# Patient Record
Sex: MALE | Race: ASIAN | NOT HISPANIC OR LATINO | URBAN - METROPOLITAN AREA
[De-identification: names, ages, dates, MRNs, and addresses within clinical notes are randomized per-mention and may not be internally consistent; named-entity substitution may affect disease eponyms.]

---

## 2018-02-20 PROBLEM — Z00.00 ENCOUNTER FOR PREVENTIVE HEALTH EXAMINATION: Status: ACTIVE | Noted: 2018-02-20

## 2018-02-21 NOTE — H&P ADULT - HISTORY OF PRESENT ILLNESS
63 yo male with a PMHx carotid artery disease, HTN, DMII, known CAD with prior MI 15 years ago treated with PTCA @ [facility] (unable to locate report) presented to cardiologist with the complaint of left sided chest pain/tightness radiating down left arm occurring independent of activity and exacerbated with exertion x several weeks. Patient also experiences SANDOVAL when ambulating more than 3 city blocks associated with overall fatigued relieved with rest. Patient denies dizziness, palpitations, PND, orthopnea, syncope, leg edema. Nuclear Stress Test (1/31/18) revealed anterior and apical reversible perfusion defect with WMA, LVEF 58%. Echo (1/18/18) revealed normal LV systolic function with no WMA. Mild MR and TR, hypertensive heart disease. EF 59%. In light of patient’s risk factors, known CAD and CCS Angina Class IV Symptoms, pt now presents for cardiac catheterization with possible intervention if clinically indicated.

## 2018-02-21 NOTE — H&P ADULT - ASSESSMENT
61 yo male with a PMHx carotid artery disease, HTN, DMII, known CAD with prior MI 15 years ago treated with PTCA @ [facility] (unable to locate report) presented to cardiologist with the complaint of left sided chest pain/tightness radiating down left arm occurring independent of activity and exacerbated with exertion x several weeks. In light of patient’s risk factors, known CAD, abnormal NST, and CCS Angina Class IV Symptoms, pt now presents for cardiac catheterization with possible intervention if clinically indicated.     - s/p  mg PO once, Plavix 600 mg PO once  - NS @ 75cc / hr precath fluids  - hgb/hct and bun/cr WNL

## 2018-02-21 NOTE — H&P ADULT - NSHPPHYSICALEXAM_GEN_ALL_CORE
Appearance: Normal	  Neck: Supple, - JVD  Cardiovascular: Normal S1 S2, No JVD, No murmurs  Respiratory: Lungs clear to auscultation/No Rales, Rhonchi, Wheezing	  Gastrointestinal:  Soft, Non-tender	  Skin: No rashes, No ecchymoses, No cyanosis  Extremities: Normal range of motion, No clubbing, cyanosis or edema  Vascular: Femoral pulses 2+ b/l without bruit, DP 2+ b/l, PT 2+ b/l, rad 2+ b/l  Neurologic: Non-focal  Psychiatry: A & O x 3, Mood & affect appropriate

## 2018-02-21 NOTE — H&P ADULT - NSHPLABSRESULTS_GEN_ALL_CORE
VS: 158/80, HR 55, T 98.6F, O2 98%, RR 17, O2 98%, Ht 5'3, Wt 168 lbs, ECG: sinus bradycardia                  15.2   4.5   )-----------( 165      ( 22 Feb 2018 13:19 )             44.4   02-22    138  |  98  |  20  ----------------------------<  129<H>  4.0   |  29  |  0.90    Ca    9.5      22 Feb 2018 13:19    PT/INR - ( 22 Feb 2018 13:19 )   PT: 11.3 sec;   INR: 1.02          PTT - ( 22 Feb 2018 13:19 )  PTT:27.5 sec  Lipid Profile (02.22.18 @ 13:19)    Total Cholesterol/HDL Ratio Measurement: 3.9 RATIO    Cholesterol, Serum: 197 mg/dL    Triglycerides, Serum: 286 mg/dL    HDL Cholesterol, Serum: 51 mg/dL    Direct LDL: 89: LDL Cholesterol --- Interpretive Comment (for adults 18 and over)  Below 70                  Ideal for people at very high risk of heart  disease  Below 100                Ideal for people at risk of heart disease  100 - 129                  [place]  130 - 159                  Borderline high  160 - 189                  High  190 and Above        Very high mg/dL

## 2018-02-22 ENCOUNTER — INPATIENT (INPATIENT)
Facility: HOSPITAL | Age: 63
LOS: 0 days | Discharge: HOME | DRG: 247 | End: 2018-02-23
Attending: INTERNAL MEDICINE | Admitting: INTERNAL MEDICINE
Payer: COMMERCIAL

## 2018-02-22 VITALS
OXYGEN SATURATION: 99 % | HEART RATE: 66 BPM | DIASTOLIC BLOOD PRESSURE: 84 MMHG | RESPIRATION RATE: 16 BRPM | SYSTOLIC BLOOD PRESSURE: 174 MMHG

## 2018-02-22 LAB
ANION GAP SERPL CALC-SCNC: 11 MMOL/L — SIGNIFICANT CHANGE UP (ref 5–17)
APTT BLD: 27.5 SEC — SIGNIFICANT CHANGE UP (ref 27.5–37.4)
BASOPHILS NFR BLD AUTO: 0.7 % — SIGNIFICANT CHANGE UP (ref 0–2)
BUN SERPL-MCNC: 20 MG/DL — SIGNIFICANT CHANGE UP (ref 7–23)
CALCIUM SERPL-MCNC: 9.5 MG/DL — SIGNIFICANT CHANGE UP (ref 8.4–10.5)
CHLORIDE SERPL-SCNC: 98 MMOL/L — SIGNIFICANT CHANGE UP (ref 96–108)
CHOLEST SERPL-MCNC: 197 MG/DL — SIGNIFICANT CHANGE UP (ref 10–199)
CK MB CFR SERPL CALC: 2.3 NG/ML — SIGNIFICANT CHANGE UP (ref 0–6.7)
CO2 SERPL-SCNC: 29 MMOL/L — SIGNIFICANT CHANGE UP (ref 22–31)
CREAT SERPL-MCNC: 0.9 MG/DL — SIGNIFICANT CHANGE UP (ref 0.5–1.3)
CRP SERPL-MCNC: <0.03 MG/DL — SIGNIFICANT CHANGE UP (ref 0–0.4)
EOSINOPHIL NFR BLD AUTO: 4.9 % — SIGNIFICANT CHANGE UP (ref 0–6)
GLUCOSE BLDC GLUCOMTR-MCNC: 114 MG/DL — HIGH (ref 70–99)
GLUCOSE BLDC GLUCOMTR-MCNC: 154 MG/DL — HIGH (ref 70–99)
GLUCOSE BLDC GLUCOMTR-MCNC: 179 MG/DL — HIGH (ref 70–99)
GLUCOSE SERPL-MCNC: 129 MG/DL — HIGH (ref 70–99)
HBA1C BLD-MCNC: 7.5 % — HIGH (ref 4–5.6)
HCT VFR BLD CALC: 44.4 % — SIGNIFICANT CHANGE UP (ref 39–50)
HDLC SERPL-MCNC: 51 MG/DL — SIGNIFICANT CHANGE UP (ref 40–125)
HGB BLD-MCNC: 15.2 G/DL — SIGNIFICANT CHANGE UP (ref 13–17)
INR BLD: 1.02 — SIGNIFICANT CHANGE UP (ref 0.88–1.16)
LIPID PNL WITH DIRECT LDL SERPL: 89 MG/DL — SIGNIFICANT CHANGE UP
LYMPHOCYTES # BLD AUTO: 36.1 % — SIGNIFICANT CHANGE UP (ref 13–44)
MCHC RBC-ENTMCNC: 29.1 PG — SIGNIFICANT CHANGE UP (ref 27–34)
MCHC RBC-ENTMCNC: 34.2 G/DL — SIGNIFICANT CHANGE UP (ref 32–36)
MCV RBC AUTO: 85.1 FL — SIGNIFICANT CHANGE UP (ref 80–100)
MONOCYTES NFR BLD AUTO: 7.3 % — SIGNIFICANT CHANGE UP (ref 2–14)
NEUTROPHILS NFR BLD AUTO: 51 % — SIGNIFICANT CHANGE UP (ref 43–77)
PLATELET # BLD AUTO: 165 K/UL — SIGNIFICANT CHANGE UP (ref 150–400)
POTASSIUM SERPL-MCNC: 4 MMOL/L — SIGNIFICANT CHANGE UP (ref 3.5–5.3)
POTASSIUM SERPL-SCNC: 4 MMOL/L — SIGNIFICANT CHANGE UP (ref 3.5–5.3)
PROTHROM AB SERPL-ACNC: 11.3 SEC — SIGNIFICANT CHANGE UP (ref 9.8–12.7)
RBC # BLD: 5.22 M/UL — SIGNIFICANT CHANGE UP (ref 4.2–5.8)
RBC # FLD: 12.7 % — SIGNIFICANT CHANGE UP (ref 10.3–16.9)
SODIUM SERPL-SCNC: 138 MMOL/L — SIGNIFICANT CHANGE UP (ref 135–145)
TOTAL CHOLESTEROL/HDL RATIO MEASUREMENT: 3.9 RATIO — SIGNIFICANT CHANGE UP (ref 3.4–9.6)
TRIGL SERPL-MCNC: 286 MG/DL — HIGH (ref 10–149)
WBC # BLD: 4.5 K/UL — SIGNIFICANT CHANGE UP (ref 3.8–10.5)
WBC # FLD AUTO: 4.5 K/UL — SIGNIFICANT CHANGE UP (ref 3.8–10.5)

## 2018-02-22 PROCEDURE — 93010 ELECTROCARDIOGRAM REPORT: CPT

## 2018-02-22 PROCEDURE — 93458 L HRT ARTERY/VENTRICLE ANGIO: CPT | Mod: 26,XU

## 2018-02-22 PROCEDURE — 92928 PRQ TCAT PLMT NTRAC ST 1 LES: CPT | Mod: LD

## 2018-02-22 RX ORDER — SODIUM CHLORIDE 9 MG/ML
500 INJECTION INTRAMUSCULAR; INTRAVENOUS; SUBCUTANEOUS
Qty: 0 | Refills: 0 | Status: DISCONTINUED | OUTPATIENT
Start: 2018-02-22 | End: 2018-02-23

## 2018-02-22 RX ORDER — INSULIN LISPRO 100/ML
VIAL (ML) SUBCUTANEOUS
Qty: 0 | Refills: 0 | Status: DISCONTINUED | OUTPATIENT
Start: 2018-02-22 | End: 2018-02-23

## 2018-02-22 RX ORDER — SODIUM CHLORIDE 9 MG/ML
1000 INJECTION, SOLUTION INTRAVENOUS
Qty: 0 | Refills: 0 | Status: DISCONTINUED | OUTPATIENT
Start: 2018-02-22 | End: 2018-02-23

## 2018-02-22 RX ORDER — SIMVASTATIN 20 MG/1
20 TABLET, FILM COATED ORAL AT BEDTIME
Qty: 0 | Refills: 0 | Status: DISCONTINUED | OUTPATIENT
Start: 2018-02-22 | End: 2018-02-23

## 2018-02-22 RX ORDER — DEXTROSE 50 % IN WATER 50 %
1 SYRINGE (ML) INTRAVENOUS ONCE
Qty: 0 | Refills: 0 | Status: DISCONTINUED | OUTPATIENT
Start: 2018-02-22 | End: 2018-02-23

## 2018-02-22 RX ORDER — CLOPIDOGREL BISULFATE 75 MG/1
75 TABLET, FILM COATED ORAL DAILY
Qty: 0 | Refills: 0 | Status: DISCONTINUED | OUTPATIENT
Start: 2018-02-22 | End: 2018-02-23

## 2018-02-22 RX ORDER — ASPIRIN/CALCIUM CARB/MAGNESIUM 324 MG
81 TABLET ORAL DAILY
Qty: 0 | Refills: 0 | Status: DISCONTINUED | OUTPATIENT
Start: 2018-02-22 | End: 2018-02-23

## 2018-02-22 RX ORDER — LISINOPRIL 2.5 MG/1
10 TABLET ORAL DAILY
Qty: 0 | Refills: 0 | Status: DISCONTINUED | OUTPATIENT
Start: 2018-02-22 | End: 2018-02-23

## 2018-02-22 RX ORDER — ASPIRIN/CALCIUM CARB/MAGNESIUM 324 MG
325 TABLET ORAL ONCE
Qty: 0 | Refills: 0 | Status: COMPLETED | OUTPATIENT
Start: 2018-02-22 | End: 2018-02-22

## 2018-02-22 RX ORDER — DEXTROSE 50 % IN WATER 50 %
25 SYRINGE (ML) INTRAVENOUS ONCE
Qty: 0 | Refills: 0 | Status: DISCONTINUED | OUTPATIENT
Start: 2018-02-22 | End: 2018-02-23

## 2018-02-22 RX ORDER — DEXTROSE 50 % IN WATER 50 %
12.5 SYRINGE (ML) INTRAVENOUS ONCE
Qty: 0 | Refills: 0 | Status: DISCONTINUED | OUTPATIENT
Start: 2018-02-22 | End: 2018-02-23

## 2018-02-22 RX ORDER — SODIUM CHLORIDE 9 MG/ML
1000 INJECTION INTRAMUSCULAR; INTRAVENOUS; SUBCUTANEOUS
Qty: 0 | Refills: 0 | Status: DISCONTINUED | OUTPATIENT
Start: 2018-02-22 | End: 2018-02-22

## 2018-02-22 RX ORDER — CLOPIDOGREL BISULFATE 75 MG/1
600 TABLET, FILM COATED ORAL ONCE
Qty: 0 | Refills: 0 | Status: COMPLETED | OUTPATIENT
Start: 2018-02-22 | End: 2018-02-22

## 2018-02-22 RX ORDER — GLUCAGON INJECTION, SOLUTION 0.5 MG/.1ML
1 INJECTION, SOLUTION SUBCUTANEOUS ONCE
Qty: 0 | Refills: 0 | Status: DISCONTINUED | OUTPATIENT
Start: 2018-02-22 | End: 2018-02-23

## 2018-02-22 RX ADMIN — Medication 325 MILLIGRAM(S): at 14:24

## 2018-02-22 RX ADMIN — SIMVASTATIN 20 MILLIGRAM(S): 20 TABLET, FILM COATED ORAL at 22:34

## 2018-02-22 RX ADMIN — Medication 1: at 22:45

## 2018-02-22 RX ADMIN — SODIUM CHLORIDE 75 MILLILITER(S): 9 INJECTION INTRAMUSCULAR; INTRAVENOUS; SUBCUTANEOUS at 20:56

## 2018-02-22 RX ADMIN — CLOPIDOGREL BISULFATE 600 MILLIGRAM(S): 75 TABLET, FILM COATED ORAL at 14:24

## 2018-02-22 RX ADMIN — SODIUM CHLORIDE 75 MILLILITER(S): 9 INJECTION INTRAMUSCULAR; INTRAVENOUS; SUBCUTANEOUS at 14:24

## 2018-02-22 NOTE — PROGRESS NOTE ADULT - SUBJECTIVE AND OBJECTIVE BOX
Interventional Cardiology Radial band Removal Note    s/p Heparin 			s/p Angiomax    Pt without complaints.  VSS.    Right Radial access site Radar Hemoband in place, No hematoma, no bleed  Radial pulse: 2+    Hemostasis achieved with manual release of hemoband.    No  Vasovagal reaction.    Meds given: none    Right Radial access site  No hematoma, no bleed  Radial pulse: 2+    A/P:  s/p PTCA/Stent   -	continue to monitor  -	-OOB as tolerated  -	Post Procedure Instructions given  -                   Call [phone] if any access site issues.

## 2018-02-23 ENCOUNTER — TRANSCRIPTION ENCOUNTER (OUTPATIENT)
Age: 63
End: 2018-02-23

## 2018-02-23 VITALS — TEMPERATURE: 98 F

## 2018-02-23 LAB
ALBUMIN SERPL ELPH-MCNC: 3.9 G/DL — SIGNIFICANT CHANGE UP (ref 3.3–5)
ALP SERPL-CCNC: 44 U/L — SIGNIFICANT CHANGE UP (ref 40–120)
ALT FLD-CCNC: 16 U/L — SIGNIFICANT CHANGE UP (ref 10–45)
ANION GAP SERPL CALC-SCNC: 12 MMOL/L — SIGNIFICANT CHANGE UP (ref 5–17)
AST SERPL-CCNC: 17 U/L — SIGNIFICANT CHANGE UP (ref 10–40)
BILIRUB SERPL-MCNC: 0.8 MG/DL — SIGNIFICANT CHANGE UP (ref 0.2–1.2)
BUN SERPL-MCNC: 17 MG/DL — SIGNIFICANT CHANGE UP (ref 7–23)
CALCIUM SERPL-MCNC: 8.9 MG/DL — SIGNIFICANT CHANGE UP (ref 8.4–10.5)
CHLORIDE SERPL-SCNC: 101 MMOL/L — SIGNIFICANT CHANGE UP (ref 96–108)
CO2 SERPL-SCNC: 26 MMOL/L — SIGNIFICANT CHANGE UP (ref 22–31)
CREAT SERPL-MCNC: 0.9 MG/DL — SIGNIFICANT CHANGE UP (ref 0.5–1.3)
GLUCOSE BLDC GLUCOMTR-MCNC: 125 MG/DL — HIGH (ref 70–99)
GLUCOSE BLDC GLUCOMTR-MCNC: 146 MG/DL — HIGH (ref 70–99)
GLUCOSE SERPL-MCNC: 129 MG/DL — HIGH (ref 70–99)
HCT VFR BLD CALC: 41.1 % — SIGNIFICANT CHANGE UP (ref 39–50)
HGB BLD-MCNC: 14 G/DL — SIGNIFICANT CHANGE UP (ref 13–17)
MAGNESIUM SERPL-MCNC: 2.1 MG/DL — SIGNIFICANT CHANGE UP (ref 1.6–2.6)
MCHC RBC-ENTMCNC: 28.9 PG — SIGNIFICANT CHANGE UP (ref 27–34)
MCHC RBC-ENTMCNC: 34.1 G/DL — SIGNIFICANT CHANGE UP (ref 32–36)
MCV RBC AUTO: 84.7 FL — SIGNIFICANT CHANGE UP (ref 80–100)
PLATELET # BLD AUTO: 143 K/UL — LOW (ref 150–400)
POTASSIUM SERPL-MCNC: 4 MMOL/L — SIGNIFICANT CHANGE UP (ref 3.5–5.3)
POTASSIUM SERPL-SCNC: 4 MMOL/L — SIGNIFICANT CHANGE UP (ref 3.5–5.3)
PROT SERPL-MCNC: 6.9 G/DL — SIGNIFICANT CHANGE UP (ref 6–8.3)
RBC # BLD: 4.85 M/UL — SIGNIFICANT CHANGE UP (ref 4.2–5.8)
RBC # FLD: 12.8 % — SIGNIFICANT CHANGE UP (ref 10.3–16.9)
SODIUM SERPL-SCNC: 139 MMOL/L — SIGNIFICANT CHANGE UP (ref 135–145)
WBC # BLD: 5.3 K/UL — SIGNIFICANT CHANGE UP (ref 3.8–10.5)
WBC # FLD AUTO: 5.3 K/UL — SIGNIFICANT CHANGE UP (ref 3.8–10.5)

## 2018-02-23 PROCEDURE — 99239 HOSP IP/OBS DSCHRG MGMT >30: CPT

## 2018-02-23 PROCEDURE — 93010 ELECTROCARDIOGRAM REPORT: CPT

## 2018-02-23 RX ORDER — RAMIPRIL 5 MG
1 CAPSULE ORAL
Qty: 0 | Refills: 0 | COMMUNITY

## 2018-02-23 RX ORDER — SIMVASTATIN 20 MG/1
1 TABLET, FILM COATED ORAL
Qty: 0 | Refills: 0 | COMMUNITY

## 2018-02-23 RX ORDER — METOPROLOL TARTRATE 50 MG
1 TABLET ORAL
Qty: 30 | Refills: 3 | OUTPATIENT
Start: 2018-02-23 | End: 2018-06-22

## 2018-02-23 RX ORDER — ASPIRIN/CALCIUM CARB/MAGNESIUM 324 MG
1 TABLET ORAL
Qty: 30 | Refills: 11 | OUTPATIENT
Start: 2018-02-23 | End: 2019-02-17

## 2018-02-23 RX ORDER — ASPIRIN/CALCIUM CARB/MAGNESIUM 324 MG
1 TABLET ORAL
Qty: 0 | Refills: 0 | COMMUNITY

## 2018-02-23 RX ORDER — RAMIPRIL 5 MG
1 CAPSULE ORAL
Qty: 30 | Refills: 3 | OUTPATIENT
Start: 2018-02-23 | End: 2018-06-22

## 2018-02-23 RX ORDER — CLOPIDOGREL BISULFATE 75 MG/1
1 TABLET, FILM COATED ORAL
Qty: 30 | Refills: 11 | OUTPATIENT
Start: 2018-02-23 | End: 2019-02-17

## 2018-02-23 RX ORDER — ATORVASTATIN CALCIUM 80 MG/1
1 TABLET, FILM COATED ORAL
Qty: 30 | Refills: 3 | OUTPATIENT
Start: 2018-02-23 | End: 2018-06-22

## 2018-02-23 RX ORDER — LISINOPRIL 2.5 MG/1
1 TABLET ORAL
Qty: 0 | Refills: 0 | COMMUNITY
Start: 2018-02-23

## 2018-02-23 RX ADMIN — LISINOPRIL 10 MILLIGRAM(S): 2.5 TABLET ORAL at 07:11

## 2018-02-23 RX ADMIN — Medication 81 MILLIGRAM(S): at 11:00

## 2018-02-23 RX ADMIN — CLOPIDOGREL BISULFATE 75 MILLIGRAM(S): 75 TABLET, FILM COATED ORAL at 11:00

## 2018-02-23 NOTE — DISCHARGE NOTE ADULT - INSTRUCTIONS
Please continue a heart healthy diet low in sodium, cholesterol, and fat.   You underwent a coronary angiogram and should wait 3 days before returning to ordinary activities. Do not drive for 2 days. Consult your doctor before returning to vigorous activity. You may return to work in 3-5 days. The catheter from your wrist was removed and you should remove the dressing in 24 hours. You may shower once the dressing is removed, but avoid baths, hot tubs, or swimming for 5 days to prevent infection. If you notice bleeding from the site, hardening and pain at the site, drainage or redness from the site, coolness/paleness of the extremity, swelling, or fever, please call [phone]. Please continue your aspirin and plavix as prescribed unless otherwise indicated by your cardiologist. All of your prescriptions have been sent to the pharmacy. Please follow up with Dr. Pereira in 1-2 weeks. All of your needed prescriptions have been sent

## 2018-02-23 NOTE — DISCHARGE NOTE ADULT - PLAN OF CARE
You have a diagnosis of coronary artery disease and have been started on daily aspirin and plavix. You have a diagnosis of coronary artery disease and received a stent to your coronary artery. You should continue daily aspirin and Plavix to ensure your stent does not close. DO NOT STOP THESE MEDICATIONS FOR ANY REASON UNLESS OTHERWISE INDICATED BY YOUR CARDIOLOGIST BECAUSE THIS WILL PUT YOU AT RISK FOR A HEART ATTACK. You should refrain from strenuous activity and heavy lifting for 1 week. Please make a follow up appointment with your cardiologist within 1-2 weeks of your discharge. All of your prescriptions have been sent electronically to your pharmacy. -continue taking aspirin 81mg orally daily and Plavix 75mg orally daily You have diabetes. PLEASE DO NOT TAKE METFORMIN FOR 48 HOURS AFTER PROCEDURE. IT MAY INTERACT WITH CONTRAST DYE GIVEN DURING PROCEDURE. Please follow up with outpatient physician. - Do not start your METFORMIN until 02/25/2018, in the meantime monitor blood sugar with daily finger sticks You have a history of elevated blood pressure and you should continue your blood pressure medications as prescribed.  Follow up with primary physician/cardiologist. - continue taking ramipril 2.5mg orally once daily  -started on Toprol 25mg orally once daily  -monitor blood pressure goal <140/90 Please continue your daily statin to ensure your cardiac stent remains open. -discontinue simvastatin 20mg and start atorvastatin 20mg orally daily

## 2018-02-23 NOTE — DISCHARGE NOTE ADULT - HOSPITAL COURSE
63 yo male with a PMHx carotid artery disease, HTN, DMII, known CAD with prior MI 15 years ago treated with PTCA @ [facility] (unable to locate report) presented to cardiologist with the complaint of left sided chest pain/tightness radiating down left arm occurring independent of activity and exacerbated with exertion x several weeks. Patient also experiences SANDOVAL when ambulating more than 3 city blocks associated with overall fatigued relieved with rest. Patient denies dizziness, palpitations, PND, orthopnea, syncope, leg edema. Nuclear Stress Test (1/31/18) revealed anterior and apical reversible perfusion defect with WMA, LVEF 58%. Echo (1/18/18) revealed normal LV systolic function with no WMA. Mild MR and TR, hypertensive heart disease. EF 59%. In light of patient’s risk factors, known CAD and CCS Angina Class IV Symptoms, pt now presents for cardiac catheterization with possible intervention if clinically indicated.     Pt is s/p cath 2/22 received UNA to pLAD. Has 80% residual lesion in RCA will return in 5 weeks for staged PCI. R radial band applied and removed w/o complication. Overnight, no events. This AM, feels well, denies CP, SOB, n/v/d, LE edema. VSS, no events on tele, labs unremarkable PE sig for R radial site stable c/d/i, no bleeding, swelling, hematoma. Patient was seen and examined by attending who agrees with above d/c plan. All meds rx to pharmacy and explained to patient. Patient instructed to return if sx worsen including not limited to chest pain, shortness of breath.

## 2018-02-23 NOTE — DISCHARGE NOTE ADULT - MEDICATION SUMMARY - MEDICATIONS TO TAKE
I will START or STAY ON the medications listed below when I get home from the hospital:    Aspirin Enteric Coated 81 mg oral delayed release tablet  -- 1 tab(s) by mouth once a day  -- Indication: For Coronary artery disease    lisinopril 10 mg oral tablet  -- 1 tab(s) by mouth once a day  -- Indication: For High blood pressure    ramipril 2.5 mg oral capsule  -- 1 cap(s) by mouth once a day  -- Indication: For High blood pressure    metFORMIN 500 mg oral tablet  -- 1 tab(s) by mouth 2 times a day  -- Indication: For Diabetes    atorvastatin 20 mg oral tablet  -- 1 tab(s) by mouth once a day   -- Avoid grapefruit and grapefruit juice while taking this medication.  Do not take this drug if you are pregnant.  It is very important that you take or use this exactly as directed.  Do not skip doses or discontinue unless directed by your doctor.  Obtain medical advice before taking any non-prescription drugs as some may affect the action of this medication.  Take with food or milk.    -- Indication: For High cholesterol    clopidogrel 75 mg oral tablet  -- 1 tab(s) by mouth once a day  -- Indication: For Coronary artery disease    Toprol-XL 25 mg oral tablet, extended release  -- 1 tab(s) by mouth once a day  -- Indication: For High blood pressure I will START or STAY ON the medications listed below when I get home from the hospital:    Aspirin Enteric Coated 81 mg oral delayed release tablet  -- 1 tab(s) by mouth once a day  -- Indication: For Coronary artery disease    ramipril 2.5 mg oral capsule  -- 1 cap(s) by mouth once a day  -- Indication: For High blood pressure    metFORMIN 500 mg oral tablet  -- 1 tab(s) by mouth 2 times a day  -- Indication: For Diabetes    atorvastatin 20 mg oral tablet  -- 1 tab(s) by mouth once a day   -- Avoid grapefruit and grapefruit juice while taking this medication.  Do not take this drug if you are pregnant.  It is very important that you take or use this exactly as directed.  Do not skip doses or discontinue unless directed by your doctor.  Obtain medical advice before taking any non-prescription drugs as some may affect the action of this medication.  Take with food or milk.    -- Indication: For High cholesterol    clopidogrel 75 mg oral tablet  -- 1 tab(s) by mouth once a day  -- Indication: For Coronary artery disease    Toprol-XL 25 mg oral tablet, extended release  -- 1 tab(s) by mouth once a day  -- Indication: For High blood pressure

## 2018-02-23 NOTE — DISCHARGE NOTE ADULT - CARE PLAN
Principal Discharge DX:	Coronary artery disease  Goal:	You have a diagnosis of coronary artery disease and have been started on daily aspirin and plavix. You have a diagnosis of coronary artery disease and received a stent to your coronary artery. You should continue daily aspirin and Plavix to ensure your stent does not close. DO NOT STOP THESE MEDICATIONS FOR ANY REASON UNLESS OTHERWISE INDICATED BY YOUR CARDIOLOGIST BECAUSE THIS WILL PUT YOU AT RISK FOR A HEART ATTACK. You should refrain from strenuous activity and heavy lifting for 1 week. Please make a follow up appointment with your cardiologist within 1-2 weeks of your discharge. All of your prescriptions have been sent electronically to your pharmacy.  Secondary Diagnosis:	Diabetes  Secondary Diagnosis:	Hypertension  Secondary Diagnosis:	Hyperlipidemia Principal Discharge DX:	Coronary artery disease  Goal:	You have a diagnosis of coronary artery disease and have been started on daily aspirin and plavix. You have a diagnosis of coronary artery disease and received a stent to your coronary artery. You should continue daily aspirin and Plavix to ensure your stent does not close. DO NOT STOP THESE MEDICATIONS FOR ANY REASON UNLESS OTHERWISE INDICATED BY YOUR CARDIOLOGIST BECAUSE THIS WILL PUT YOU AT RISK FOR A HEART ATTACK. You should refrain from strenuous activity and heavy lifting for 1 week. Please make a follow up appointment with your cardiologist within 1-2 weeks of your discharge. All of your prescriptions have been sent electronically to your pharmacy.  Assessment and plan of treatment:	-continue taking aspirin 81mg orally daily and Plavix 75mg orally daily  Secondary Diagnosis:	Diabetes  Goal:	You have diabetes. PLEASE DO NOT TAKE METFORMIN FOR 48 HOURS AFTER PROCEDURE. IT MAY INTERACT WITH CONTRAST DYE GIVEN DURING PROCEDURE. Please follow up with outpatient physician.  Assessment and plan of treatment:	- Do not start your METFORMIN until 02/25/2018, in the meantime monitor blood sugar with daily finger sticks  Secondary Diagnosis:	Hypertension  Goal:	You have a history of elevated blood pressure and you should continue your blood pressure medications as prescribed.  Follow up with primary physician/cardiologist.  Assessment and plan of treatment:	- continue taking ramipril 2.5mg orally once daily  -started on Toprol 25mg orally once daily  -monitor blood pressure goal <140/90  Secondary Diagnosis:	Hyperlipidemia  Goal:	Please continue your daily statin to ensure your cardiac stent remains open.  Assessment and plan of treatment:	-discontinue simvastatin 20mg and start atorvastatin 20mg orally daily

## 2018-02-23 NOTE — DISCHARGE NOTE ADULT - PATIENT PORTAL LINK FT
You can access the [facility] Patient Portal, offered by [facility], by registering with the following website: [email or web address]

## 2018-02-23 NOTE — DISCHARGE NOTE ADULT - ADDITIONAL INSTRUCTIONS
All of your medications have been prescribed to your pharmacy and please take as directed, do not stop unless directed by physician.  Please follow up with Dr. Pereira in 1-2 weeks. Please return to the hospital/seek immediate medical attention if worsening of symptoms- including not limited to chest pain, shortness of breath, right wrist pain/swelling/bleeding.

## 2018-02-23 NOTE — DISCHARGE NOTE ADULT - CARE PROVIDER_API CALL
Shaik ANGLE Pereira (DHARA), Cardiology; Nuclear Medicine  [address] [place] [address]  Phone: [phone]  Fax: [phone]

## 2018-02-26 DIAGNOSIS — I25.10 ATHEROSCLEROTIC HEART DISEASE OF NATIVE CORONARY ARTERY WITHOUT ANGINA PECTORIS: ICD-10-CM

## 2018-02-26 DIAGNOSIS — I25.84 CORONARY ATHEROSCLEROSIS DUE TO CALCIFIED CORONARY LESION: ICD-10-CM

## 2018-02-26 DIAGNOSIS — I10 ESSENTIAL (PRIMARY) HYPERTENSION: ICD-10-CM

## 2018-02-26 DIAGNOSIS — Z79.84 LONG TERM (CURRENT) USE OF ORAL HYPOGLYCEMIC DRUGS: ICD-10-CM

## 2018-02-26 DIAGNOSIS — R07.9 CHEST PAIN, UNSPECIFIED: ICD-10-CM

## 2018-02-26 DIAGNOSIS — E11.9 TYPE 2 DIABETES MELLITUS WITHOUT COMPLICATIONS: ICD-10-CM

## 2018-02-26 DIAGNOSIS — Z79.82 LONG TERM (CURRENT) USE OF ASPIRIN: ICD-10-CM

## 2018-02-26 DIAGNOSIS — I25.2 OLD MYOCARDIAL INFARCTION: ICD-10-CM

## 2018-05-03 PROBLEM — I10 ESSENTIAL (PRIMARY) HYPERTENSION: Chronic | Status: ACTIVE | Noted: 2018-02-21

## 2018-05-03 PROBLEM — E78.5 HYPERLIPIDEMIA, UNSPECIFIED: Chronic | Status: ACTIVE | Noted: 2018-02-21

## 2018-05-03 PROBLEM — E11.9 TYPE 2 DIABETES MELLITUS WITHOUT COMPLICATIONS: Chronic | Status: ACTIVE | Noted: 2018-02-21

## 2018-05-03 PROBLEM — I25.10 ATHEROSCLEROTIC HEART DISEASE OF NATIVE CORONARY ARTERY WITHOUT ANGINA PECTORIS: Chronic | Status: ACTIVE | Noted: 2018-02-21

## 2018-05-09 VITALS
SYSTOLIC BLOOD PRESSURE: 112 MMHG | TEMPERATURE: 98 F | HEART RATE: 55 BPM | OXYGEN SATURATION: 97 % | DIASTOLIC BLOOD PRESSURE: 61 MMHG | RESPIRATION RATE: 16 BRPM

## 2018-05-09 NOTE — H&P ADULT - ASSESSMENT
63 y/o male with FHX of CAD, PMHx carotid artery disease, HTN, DMII, known CAD with prior MI 15 years ago treated with PTCA @ [facility] s/p most recent cardiac cath @ [facility] 02/22/18 and received UNA to pLAD with 80% residual lesion in RCA who now presents for staged PCI of known RCA lesion.     Patient reports daily compliance with ASA 81mg and Plavix 75mg since his prior procedure in February 2018, last dose yesterday 5/9/18. Patient loaded with ASA 325mg and Plavix 75mg prior to procedure. IVF @ 75cc/hr.    ASA III and Mallampati III  Risks & benefits of procedure and alternative therapy have been explained to the patient including but not limited to: allergic reaction, bleeding w/possible need for blood transfusion, infection, renal and vascular compromise, limb damage, arrhythmia, stroke, vessel dissection/perforation, Myocardial infarction, emergent CABG. Informed consent obtained and in chart.

## 2018-05-09 NOTE — H&P ADULT - HISTORY OF PRESENT ILLNESS
63 y/o male with FHX of CAD, PMHx carotid artery disease, HTN, DMII, known CAD with prior MI 15 years ago treated with PTCA @ [facility] s/p most recent cardiac cath @ [facility] 02/22/18 and received UNA to pLAD with 80% residual lesion in RCA who now presents for staged PCI of known RCA lesion. Since his procedure, pt. reports/denies..    Of note: Pt. had Nuclear Stress Test (1/31/18) revealed anterior and apical reversible perfusion defect with WMA, LVEF 58%. Echo (1/18/18) revealed normal LV systolic function with no WMA. Mild MR and TR, hypertensive heart disease. EF 59%. In light of patient’s risk factors, known residual RCA lesion ?? CCS Angina Class IV Symptoms, pt now presents for staged PCI of RCA.     Cath @ [facility] 2/22/18: s/p UNA pLAD, patent mLAD stent, mRCA 80 %, LMCA nl, LCX: large, sized, minor luminal irregularities, LVEF 65 %, LVEDP 8 mm Hg. SKELETON!!  63 y/o male with FHX of CAD, PMHx carotid artery disease, HTN, DMII, known CAD with prior MI 15 years ago treated with PTCA @ [facility] s/p most recent cardiac cath @ [facility] 02/22/18 and received UNA to pLAD with 80% residual lesion in RCA who now presents for staged PCI of known RCA lesion. Since his procedure, pt. reports/denies..  ..   Of note: Pt. had Nuclear Stress Test (1/31/18) revealed anterior and apical reversible perfusion defect with WMA, LVEF 58%. Echo (1/18/18) revealed normal LV systolic function with no WMA. Mild MR and TR, hypertensive heart disease. EF 59%. In light of patient’s risk factors, known residual RCA lesion ?? CCS Angina Class IV Symptoms, pt now presents for staged PCI of RCA.     Cath @ [facility] 2/22/18: s/p UNA pLAD, patent mLAD stent, mRCA 80 %, LMCA nl, LCX: large, sized, minor luminal irregularities, LVEF 65 %, LVEDP 8 mm Hg. 63 y/o male with FHX of CAD, PMHx carotid artery disease, HTN, DMII, known CAD with prior MI 15 years ago treated with PTCA @ [facility] s/p most recent cardiac cath @ [facility] 02/22/18 and received UNA to pLAD with 80% residual lesion in RCA who now presents for staged PCI of known RCA lesion. Since his procedure, pt. reports feeling well. He denies CP, SOB, palpitations, n/v, diaphoresis, LE edema, orthopnea, or syncope. Of note: Pt. had Nuclear Stress Test (1/31/18) revealed anterior and apical reversible perfusion defect with WMA, LVEF 58%. Echo (1/18/18) revealed normal LV systolic function with no WMA. Mild MR and TR, hypertensive heart disease. EF 59%. In light of patient’s risk factors and known residual RCA lesion, pt now presents for staged PCI of RCA.     Cath @ [facility] 2/22/18: s/p UNA pLAD, patent mLAD stent, mRCA 80 %, LMCA nl, LCX: large, sized, minor luminal irregularities, LVEF 65 %, LVEDP 8 mm Hg.

## 2018-05-10 ENCOUNTER — INPATIENT (INPATIENT)
Facility: HOSPITAL | Age: 63
LOS: 0 days | Discharge: HOME | DRG: 247 | End: 2018-05-11
Attending: INTERNAL MEDICINE | Admitting: INTERNAL MEDICINE
Payer: MEDICAID

## 2018-05-10 LAB
ALBUMIN SERPL ELPH-MCNC: 4.7 G/DL — SIGNIFICANT CHANGE UP (ref 3.3–5)
ALP SERPL-CCNC: 62 U/L — SIGNIFICANT CHANGE UP (ref 40–120)
ALT FLD-CCNC: 27 U/L — SIGNIFICANT CHANGE UP (ref 10–45)
ANION GAP SERPL CALC-SCNC: 13 MMOL/L — SIGNIFICANT CHANGE UP (ref 5–17)
APTT BLD: 28.8 SEC — SIGNIFICANT CHANGE UP (ref 27.5–37.4)
AST SERPL-CCNC: 20 U/L — SIGNIFICANT CHANGE UP (ref 10–40)
BASOPHILS NFR BLD AUTO: 0.7 % — SIGNIFICANT CHANGE UP (ref 0–2)
BILIRUB SERPL-MCNC: 0.8 MG/DL — SIGNIFICANT CHANGE UP (ref 0.2–1.2)
BUN SERPL-MCNC: 23 MG/DL — SIGNIFICANT CHANGE UP (ref 7–23)
CALCIUM SERPL-MCNC: 9.8 MG/DL — SIGNIFICANT CHANGE UP (ref 8.4–10.5)
CHLORIDE SERPL-SCNC: 102 MMOL/L — SIGNIFICANT CHANGE UP (ref 96–108)
CHOLEST SERPL-MCNC: 204 MG/DL — HIGH (ref 10–199)
CK MB CFR SERPL CALC: 2.8 NG/ML — SIGNIFICANT CHANGE UP (ref 0–6.7)
CK SERPL-CCNC: 124 U/L — SIGNIFICANT CHANGE UP (ref 30–200)
CO2 SERPL-SCNC: 26 MMOL/L — SIGNIFICANT CHANGE UP (ref 22–31)
CREAT SERPL-MCNC: 0.91 MG/DL — SIGNIFICANT CHANGE UP (ref 0.5–1.3)
CRP SERPL-MCNC: <0.03 MG/DL — SIGNIFICANT CHANGE UP (ref 0–0.4)
EOSINOPHIL NFR BLD AUTO: 4.5 % — SIGNIFICANT CHANGE UP (ref 0–6)
GLUCOSE BLDC GLUCOMTR-MCNC: 126 MG/DL — HIGH (ref 70–99)
GLUCOSE BLDC GLUCOMTR-MCNC: 93 MG/DL — SIGNIFICANT CHANGE UP (ref 70–99)
GLUCOSE BLDC GLUCOMTR-MCNC: 93 MG/DL — SIGNIFICANT CHANGE UP (ref 70–99)
GLUCOSE SERPL-MCNC: 100 MG/DL — HIGH (ref 70–99)
HBA1C BLD-MCNC: 6.2 % — HIGH (ref 4–5.6)
HCT VFR BLD CALC: 45.2 % — SIGNIFICANT CHANGE UP (ref 39–50)
HDLC SERPL-MCNC: 48 MG/DL — SIGNIFICANT CHANGE UP (ref 40–125)
HGB BLD-MCNC: 15.2 G/DL — SIGNIFICANT CHANGE UP (ref 13–17)
INR BLD: 1.06 — SIGNIFICANT CHANGE UP (ref 0.88–1.16)
LIPID PNL WITH DIRECT LDL SERPL: 105 MG/DL — SIGNIFICANT CHANGE UP
LYMPHOCYTES # BLD AUTO: 38.1 % — SIGNIFICANT CHANGE UP (ref 13–44)
MCHC RBC-ENTMCNC: 27.9 PG — SIGNIFICANT CHANGE UP (ref 27–34)
MCHC RBC-ENTMCNC: 33.6 G/DL — SIGNIFICANT CHANGE UP (ref 32–36)
MCV RBC AUTO: 83.1 FL — SIGNIFICANT CHANGE UP (ref 80–100)
MONOCYTES NFR BLD AUTO: 8 % — SIGNIFICANT CHANGE UP (ref 2–14)
NEUTROPHILS NFR BLD AUTO: 48.7 % — SIGNIFICANT CHANGE UP (ref 43–77)
PLATELET # BLD AUTO: 175 K/UL — SIGNIFICANT CHANGE UP (ref 150–400)
POTASSIUM SERPL-MCNC: 4.2 MMOL/L — SIGNIFICANT CHANGE UP (ref 3.5–5.3)
POTASSIUM SERPL-SCNC: 4.2 MMOL/L — SIGNIFICANT CHANGE UP (ref 3.5–5.3)
PROT SERPL-MCNC: 7.7 G/DL — SIGNIFICANT CHANGE UP (ref 6–8.3)
PROTHROM AB SERPL-ACNC: 11.8 SEC — SIGNIFICANT CHANGE UP (ref 9.8–12.7)
RBC # BLD: 5.44 M/UL — SIGNIFICANT CHANGE UP (ref 4.2–5.8)
RBC # FLD: 12.9 % — SIGNIFICANT CHANGE UP (ref 10.3–16.9)
SODIUM SERPL-SCNC: 141 MMOL/L — SIGNIFICANT CHANGE UP (ref 135–145)
TOTAL CHOLESTEROL/HDL RATIO MEASUREMENT: 4.2 RATIO — SIGNIFICANT CHANGE UP (ref 3.4–9.6)
TRIGL SERPL-MCNC: 253 MG/DL — HIGH (ref 10–149)
WBC # BLD: 4.5 K/UL — SIGNIFICANT CHANGE UP (ref 3.8–10.5)
WBC # FLD AUTO: 4.5 K/UL — SIGNIFICANT CHANGE UP (ref 3.8–10.5)

## 2018-05-10 PROCEDURE — 92928 PRQ TCAT PLMT NTRAC ST 1 LES: CPT | Mod: RC

## 2018-05-10 PROCEDURE — 93010 ELECTROCARDIOGRAM REPORT: CPT | Mod: 76

## 2018-05-10 RX ORDER — DEXTROSE 50 % IN WATER 50 %
25 SYRINGE (ML) INTRAVENOUS ONCE
Qty: 0 | Refills: 0 | Status: DISCONTINUED | OUTPATIENT
Start: 2018-05-10 | End: 2018-05-11

## 2018-05-10 RX ORDER — ASPIRIN/CALCIUM CARB/MAGNESIUM 324 MG
81 TABLET ORAL DAILY
Qty: 0 | Refills: 0 | Status: DISCONTINUED | OUTPATIENT
Start: 2018-05-10 | End: 2018-05-11

## 2018-05-10 RX ORDER — CHLORHEXIDINE GLUCONATE 213 G/1000ML
1 SOLUTION TOPICAL ONCE
Qty: 0 | Refills: 0 | Status: DISCONTINUED | OUTPATIENT
Start: 2018-05-10 | End: 2018-05-10

## 2018-05-10 RX ORDER — METOPROLOL TARTRATE 50 MG
25 TABLET ORAL DAILY
Qty: 0 | Refills: 0 | Status: DISCONTINUED | OUTPATIENT
Start: 2018-05-10 | End: 2018-05-11

## 2018-05-10 RX ORDER — CLOPIDOGREL BISULFATE 75 MG/1
75 TABLET, FILM COATED ORAL ONCE
Qty: 0 | Refills: 0 | Status: COMPLETED | OUTPATIENT
Start: 2018-05-10 | End: 2018-05-10

## 2018-05-10 RX ORDER — INSULIN LISPRO 100/ML
VIAL (ML) SUBCUTANEOUS ONCE
Qty: 0 | Refills: 0 | Status: DISCONTINUED | OUTPATIENT
Start: 2018-05-10 | End: 2018-05-10

## 2018-05-10 RX ORDER — DEXTROSE 50 % IN WATER 50 %
15 SYRINGE (ML) INTRAVENOUS ONCE
Qty: 0 | Refills: 0 | Status: DISCONTINUED | OUTPATIENT
Start: 2018-05-10 | End: 2018-05-11

## 2018-05-10 RX ORDER — SODIUM CHLORIDE 9 MG/ML
500 INJECTION INTRAMUSCULAR; INTRAVENOUS; SUBCUTANEOUS
Qty: 0 | Refills: 0 | Status: DISCONTINUED | OUTPATIENT
Start: 2018-05-10 | End: 2018-05-10

## 2018-05-10 RX ORDER — ASPIRIN/CALCIUM CARB/MAGNESIUM 324 MG
325 TABLET ORAL ONCE
Qty: 0 | Refills: 0 | Status: COMPLETED | OUTPATIENT
Start: 2018-05-10 | End: 2018-05-10

## 2018-05-10 RX ORDER — SIMVASTATIN 20 MG/1
20 TABLET, FILM COATED ORAL AT BEDTIME
Qty: 0 | Refills: 0 | Status: DISCONTINUED | OUTPATIENT
Start: 2018-05-10 | End: 2018-05-10

## 2018-05-10 RX ORDER — SODIUM CHLORIDE 9 MG/ML
1000 INJECTION, SOLUTION INTRAVENOUS
Qty: 0 | Refills: 0 | Status: DISCONTINUED | OUTPATIENT
Start: 2018-05-10 | End: 2018-05-11

## 2018-05-10 RX ORDER — INSULIN LISPRO 100/ML
VIAL (ML) SUBCUTANEOUS
Qty: 0 | Refills: 0 | Status: DISCONTINUED | OUTPATIENT
Start: 2018-05-10 | End: 2018-05-11

## 2018-05-10 RX ORDER — DEXTROSE 50 % IN WATER 50 %
12.5 SYRINGE (ML) INTRAVENOUS ONCE
Qty: 0 | Refills: 0 | Status: DISCONTINUED | OUTPATIENT
Start: 2018-05-10 | End: 2018-05-11

## 2018-05-10 RX ORDER — ATORVASTATIN CALCIUM 80 MG/1
40 TABLET, FILM COATED ORAL AT BEDTIME
Qty: 0 | Refills: 0 | Status: DISCONTINUED | OUTPATIENT
Start: 2018-05-10 | End: 2018-05-11

## 2018-05-10 RX ORDER — SODIUM CHLORIDE 9 MG/ML
500 INJECTION INTRAMUSCULAR; INTRAVENOUS; SUBCUTANEOUS
Qty: 0 | Refills: 0 | Status: DISCONTINUED | OUTPATIENT
Start: 2018-05-10 | End: 2018-05-11

## 2018-05-10 RX ORDER — CLOPIDOGREL BISULFATE 75 MG/1
75 TABLET, FILM COATED ORAL DAILY
Qty: 0 | Refills: 0 | Status: DISCONTINUED | OUTPATIENT
Start: 2018-05-10 | End: 2018-05-11

## 2018-05-10 RX ORDER — GLUCAGON INJECTION, SOLUTION 0.5 MG/.1ML
1 INJECTION, SOLUTION SUBCUTANEOUS ONCE
Qty: 0 | Refills: 0 | Status: DISCONTINUED | OUTPATIENT
Start: 2018-05-10 | End: 2018-05-11

## 2018-05-10 RX ADMIN — Medication 25 MILLIGRAM(S): at 18:22

## 2018-05-10 RX ADMIN — SODIUM CHLORIDE 75 MILLILITER(S): 9 INJECTION INTRAMUSCULAR; INTRAVENOUS; SUBCUTANEOUS at 18:15

## 2018-05-10 RX ADMIN — CLOPIDOGREL BISULFATE 75 MILLIGRAM(S): 75 TABLET, FILM COATED ORAL at 13:38

## 2018-05-10 RX ADMIN — ATORVASTATIN CALCIUM 40 MILLIGRAM(S): 80 TABLET, FILM COATED ORAL at 21:20

## 2018-05-10 RX ADMIN — SODIUM CHLORIDE 75 MILLILITER(S): 9 INJECTION INTRAMUSCULAR; INTRAVENOUS; SUBCUTANEOUS at 17:05

## 2018-05-10 RX ADMIN — Medication 325 MILLIGRAM(S): at 13:38

## 2018-05-10 RX ADMIN — SODIUM CHLORIDE 75 MILLILITER(S): 9 INJECTION INTRAMUSCULAR; INTRAVENOUS; SUBCUTANEOUS at 13:36

## 2018-05-10 NOTE — PROGRESS NOTE ADULT - SUBJECTIVE AND OBJECTIVE BOX
Interventional Cardiology Radial band Removal Note    s/p Heparin 		    Pt without complaints.  VSS.    Right Radial access site Radar Hemoband in place small hematoma     Radial pulse: 2+ with good waveform    Hemostasis achieved with manual release of hemoband.    no Vasovagal reaction.    Meds given: no     Right Radial access site  _____ hematoma, ______ bleed  Radial pulse: 2+     A/P:  s/p Dx Coronary Angiogram/FFR/IVUS/PTCA/Stent ([place] one)  -	continue to monitor  -	-OOB as tolerated  -	Post Procedure Instructions given  -                   Call [phone] if any access site issues.

## 2018-05-11 ENCOUNTER — TRANSCRIPTION ENCOUNTER (OUTPATIENT)
Age: 63
End: 2018-05-11

## 2018-05-11 VITALS
OXYGEN SATURATION: 97 % | RESPIRATION RATE: 18 BRPM | SYSTOLIC BLOOD PRESSURE: 120 MMHG | DIASTOLIC BLOOD PRESSURE: 63 MMHG | HEART RATE: 61 BPM | TEMPERATURE: 98 F

## 2018-05-11 LAB
ANION GAP SERPL CALC-SCNC: 11 MMOL/L — SIGNIFICANT CHANGE UP (ref 5–17)
BASOPHILS NFR BLD AUTO: 0.6 % — SIGNIFICANT CHANGE UP (ref 0–2)
BUN SERPL-MCNC: 17 MG/DL — SIGNIFICANT CHANGE UP (ref 7–23)
CALCIUM SERPL-MCNC: 8.9 MG/DL — SIGNIFICANT CHANGE UP (ref 8.4–10.5)
CHLORIDE SERPL-SCNC: 103 MMOL/L — SIGNIFICANT CHANGE UP (ref 96–108)
CO2 SERPL-SCNC: 28 MMOL/L — SIGNIFICANT CHANGE UP (ref 22–31)
CREAT SERPL-MCNC: 0.89 MG/DL — SIGNIFICANT CHANGE UP (ref 0.5–1.3)
EOSINOPHIL NFR BLD AUTO: 4.9 % — SIGNIFICANT CHANGE UP (ref 0–6)
GLUCOSE BLDC GLUCOMTR-MCNC: 131 MG/DL — HIGH (ref 70–99)
GLUCOSE SERPL-MCNC: 129 MG/DL — HIGH (ref 70–99)
HCT VFR BLD CALC: 43.8 % — SIGNIFICANT CHANGE UP (ref 39–50)
HGB BLD-MCNC: 14.5 G/DL — SIGNIFICANT CHANGE UP (ref 13–17)
LYMPHOCYTES # BLD AUTO: 27.9 % — SIGNIFICANT CHANGE UP (ref 13–44)
MAGNESIUM SERPL-MCNC: 2.1 MG/DL — SIGNIFICANT CHANGE UP (ref 1.6–2.6)
MCHC RBC-ENTMCNC: 27.6 PG — SIGNIFICANT CHANGE UP (ref 27–34)
MCHC RBC-ENTMCNC: 33.1 G/DL — SIGNIFICANT CHANGE UP (ref 32–36)
MCV RBC AUTO: 83.4 FL — SIGNIFICANT CHANGE UP (ref 80–100)
MONOCYTES NFR BLD AUTO: 5.6 % — SIGNIFICANT CHANGE UP (ref 2–14)
NEUTROPHILS NFR BLD AUTO: 61 % — SIGNIFICANT CHANGE UP (ref 43–77)
PLATELET # BLD AUTO: 150 K/UL — SIGNIFICANT CHANGE UP (ref 150–400)
POTASSIUM SERPL-MCNC: 4.1 MMOL/L — SIGNIFICANT CHANGE UP (ref 3.5–5.3)
POTASSIUM SERPL-SCNC: 4.1 MMOL/L — SIGNIFICANT CHANGE UP (ref 3.5–5.3)
RBC # BLD: 5.25 M/UL — SIGNIFICANT CHANGE UP (ref 4.2–5.8)
RBC # FLD: 12.9 % — SIGNIFICANT CHANGE UP (ref 10.3–16.9)
SODIUM SERPL-SCNC: 142 MMOL/L — SIGNIFICANT CHANGE UP (ref 135–145)
WBC # BLD: 5.3 K/UL — SIGNIFICANT CHANGE UP (ref 3.8–10.5)
WBC # FLD AUTO: 5.3 K/UL — SIGNIFICANT CHANGE UP (ref 3.8–10.5)

## 2018-05-11 PROCEDURE — 99239 HOSP IP/OBS DSCHRG MGMT >30: CPT

## 2018-05-11 RX ORDER — METFORMIN HYDROCHLORIDE 850 MG/1
1 TABLET ORAL
Qty: 0 | Refills: 0 | COMMUNITY

## 2018-05-11 RX ORDER — ATORVASTATIN CALCIUM 80 MG/1
1 TABLET, FILM COATED ORAL
Qty: 30 | Refills: 1 | OUTPATIENT
Start: 2018-05-11 | End: 2018-07-09

## 2018-05-11 RX ORDER — CLOPIDOGREL BISULFATE 75 MG/1
1 TABLET, FILM COATED ORAL
Qty: 30 | Refills: 11 | OUTPATIENT
Start: 2018-05-11 | End: 2019-05-05

## 2018-05-11 RX ORDER — ASPIRIN/CALCIUM CARB/MAGNESIUM 324 MG
1 TABLET ORAL
Qty: 30 | Refills: 11 | OUTPATIENT
Start: 2018-05-11 | End: 2019-05-05

## 2018-05-11 RX ORDER — SIMVASTATIN 20 MG/1
1 TABLET, FILM COATED ORAL
Qty: 0 | Refills: 0 | COMMUNITY

## 2018-05-11 RX ADMIN — Medication 25 MILLIGRAM(S): at 05:52

## 2018-05-11 RX ADMIN — CLOPIDOGREL BISULFATE 75 MILLIGRAM(S): 75 TABLET, FILM COATED ORAL at 10:58

## 2018-05-11 RX ADMIN — Medication 81 MILLIGRAM(S): at 10:58

## 2018-05-11 NOTE — DISCHARGE NOTE ADULT - HOSPITAL COURSE
63 y/o male with FHX of CAD, PMHx carotid artery disease, HTN, DMII, known CAD with prior MI 15 years ago treated with PTCA @ [facility] s/p most recent cardiac cath @ [facility] 02/22/18 and received UNA to pLAD with 80% residual lesion in RCA who now presents for staged PCI of known RCA lesion. Since his procedure, pt. reports feeling well. He denies CP, SOB, palpitations, n/v, diaphoresis, LE edema, orthopnea, or syncope. Of note: Pt. had Nuclear Stress Test (1/31/18) revealed anterior and apical reversible perfusion defect with WMA, LVEF 58%. Echo (1/18/18) revealed normal LV systolic function with no WMA. Mild MR and TR, hypertensive heart disease. EF 59%. In light of patient’s risk factors and known residual RCA lesion, pt now presents for staged PCI of RCA. s/p Cardiac Cath with UNA mRCA(patent LAD stents). R radial access with radial band  removed and s/p 10 minutes manual compression over site for dime sized hematoma. R arm sore but soft with good pulse and waveform and sensation and motor intact. Pt seen and examined with Dr. Foster this AM, EKG and labs as well as VSS. Pt to c/w home medication regimen which he has multiple refills on except instructed to switch to lipitor 40mg in place os simvastatin 20mg daily as lipids are not significantly changed. ASA/Plavix/Lipitor sent to pharmacy.

## 2018-05-11 NOTE — DISCHARGE NOTE ADULT - MEDICATION SUMMARY - MEDICATIONS TO TAKE
I will START or STAY ON the medications listed below when I get home from the hospital:    Aspirin Enteric Coated 81 mg oral delayed release tablet  -- 1 tab(s) by mouth once a day  -- Indication: For Coronary Artery Disease    ramipril 2.5 mg oral capsule  -- 1 cap(s) by mouth once a day  -- Indication: For Hypertension    metFORMIN 500 mg oral tablet  -- 1 tab(s) by mouth 2 times a day    Hold until Monday 5/14/18  -- Indication: For Diabetes- resume on Monday 5/14/18    atorvastatin 40 mg oral tablet  -- 1 tab(s) by mouth once a day (at bedtime)  -- Indication: For Hyperlipidemia    clopidogrel 75 mg oral tablet  -- 1 tab(s) by mouth once a day  -- Indication: For Coronary Artery Disease    Toprol-XL 25 mg oral tablet, extended release  -- 1 tab(s) by mouth once a day  -- Indication: For Hypertension

## 2018-05-11 NOTE — DISCHARGE NOTE ADULT - PLAN OF CARE
You had a Cardiac Catheterization and a stent to a blocked artery. You need to continue Aspirin 81mg and Plavix 75mg daily and DO NOT STOP THESE MEDICATIONS UNLESS INSTRUCTED BY YOUR CARDIOLOGIST ONLY You underwent a coronary angiogram and should wait 3 days before returning to ordinary activities. Do not drive for 2 days. Consult your doctor before returning to vigorous activity. You may return to work in 3-5 days. The catheter from your wrist was removed. You may shower once the dressing is removed, but avoid baths, hot tubs, or swimming for 5 days to prevent infection. If you notice bleeding from the site, hardening and pain at the site, drainage or redness from the site, coolness/paleness of the extremity, swelling, or fever, please call [phone]. Please continue your aspirin and plavix as prescribed unless otherwise indicated by your cardiologist. All of your prescriptions have been sent to the pharmacy. Please follow up with Dr. Pereira in 1-2 weeks. All of your needed prescriptions have been sent electronically to your pharmacy. Continue yoru home Ramipril and Metoprolol as prescribed You have a history of elevated blood pressure and you should continue your blood pressure medications as prescribed. Stop your Simvastatin and take Lipitor 40mg daily at bedtime. Please continue your daily statin to ensure your cardiac stent remains open. You have a diagnosis of diabetes and should continue all of your diabetic medications as prescribed. Please do not take your metformin until Monday 5/14/18 due to the contrast used in the procedure.

## 2018-05-11 NOTE — DISCHARGE NOTE ADULT - CARE PROVIDER_API CALL
Shaik ANGLE Pereira (DHARA), Cardiology; Nuclear Medicine  [address] [place] [address]  Phone: [phone]  Fax: [phone]

## 2018-05-11 NOTE — DISCHARGE NOTE ADULT - PATIENT PORTAL LINK FT
You can access the [facility] Portal, offered by [facility], by registering with the following website: [email or web address]

## 2018-05-15 DIAGNOSIS — I97.630 POSTPROCEDURAL HEMATOMA OF A CIRCULATORY SYSTEM ORGAN OR STRUCTURE FOLLOWING A CARDIAC CATHETERIZATION: ICD-10-CM

## 2018-05-15 DIAGNOSIS — I25.2 OLD MYOCARDIAL INFARCTION: ICD-10-CM

## 2018-05-15 DIAGNOSIS — E11.9 TYPE 2 DIABETES MELLITUS WITHOUT COMPLICATIONS: ICD-10-CM

## 2018-05-15 DIAGNOSIS — Z79.82 LONG TERM (CURRENT) USE OF ASPIRIN: ICD-10-CM

## 2018-05-15 DIAGNOSIS — I08.1 RHEUMATIC DISORDERS OF BOTH MITRAL AND TRICUSPID VALVES: ICD-10-CM

## 2018-05-15 DIAGNOSIS — Z95.5 PRESENCE OF CORONARY ANGIOPLASTY IMPLANT AND GRAFT: ICD-10-CM
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
## 2018-05-15 DIAGNOSIS — I11.9 HYPERTENSIVE HEART DISEASE WITHOUT HEART FAILURE: ICD-10-CM

## 2018-05-15 DIAGNOSIS — I25.10 ATHEROSCLEROTIC HEART DISEASE OF NATIVE CORONARY ARTERY WITHOUT ANGINA PECTORIS: ICD-10-CM

## 2018-05-15 DIAGNOSIS — I25.110 ATHEROSCLEROTIC HEART DISEASE OF NATIVE CORONARY ARTERY WITH UNSTABLE ANGINA PECTORIS: ICD-10-CM

## 2018-05-15 DIAGNOSIS — Z79.84 LONG TERM (CURRENT) USE OF ORAL HYPOGLYCEMIC DRUGS: ICD-10-CM

## 2018-05-23 PROCEDURE — 85025 COMPLETE CBC W/AUTO DIFF WBC: CPT

## 2018-05-23 PROCEDURE — 82962 GLUCOSE BLOOD TEST: CPT

## 2018-05-23 PROCEDURE — 93005 ELECTROCARDIOGRAM TRACING: CPT

## 2018-05-23 PROCEDURE — 36415 COLL VENOUS BLD VENIPUNCTURE: CPT

## 2018-05-23 PROCEDURE — C1887: CPT

## 2018-05-23 PROCEDURE — 80048 BASIC METABOLIC PNL TOTAL CA: CPT

## 2018-05-23 PROCEDURE — 83735 ASSAY OF MAGNESIUM: CPT

## 2018-05-23 PROCEDURE — 83036 HEMOGLOBIN GLYCOSYLATED A1C: CPT

## 2018-05-23 PROCEDURE — 85610 PROTHROMBIN TIME: CPT

## 2018-05-23 PROCEDURE — 85730 THROMBOPLASTIN TIME PARTIAL: CPT

## 2018-05-23 PROCEDURE — C1874: CPT

## 2018-05-23 PROCEDURE — 80061 LIPID PANEL: CPT

## 2018-05-23 PROCEDURE — 82550 ASSAY OF CK (CPK): CPT

## 2018-05-23 PROCEDURE — 86140 C-REACTIVE PROTEIN: CPT

## 2018-05-23 PROCEDURE — C1725: CPT

## 2018-05-23 PROCEDURE — 82553 CREATINE MB FRACTION: CPT

## 2018-05-23 PROCEDURE — C1769: CPT

## 2018-05-23 PROCEDURE — 80053 COMPREHEN METABOLIC PANEL: CPT

## 2018-05-23 PROCEDURE — 85027 COMPLETE CBC AUTOMATED: CPT

## 2018-11-12 NOTE — DISCHARGE NOTE ADULT - CARE PLAN
Principal Discharge DX:	Coronary artery disease  Goal:	You had a Cardiac Catheterization and a stent to a blocked artery. You need to continue Aspirin 81mg and Plavix 75mg daily and DO NOT STOP THESE MEDICATIONS UNLESS INSTRUCTED BY YOUR CARDIOLOGIST ONLY  Assessment and plan of treatment:	You underwent a coronary angiogram and should wait 3 days before returning to ordinary activities. Do not drive for 2 days. Consult your doctor before returning to vigorous activity. You may return to work in 3-5 days. The catheter from your wrist was removed. You may shower once the dressing is removed, but avoid baths, hot tubs, or swimming for 5 days to prevent infection. If you notice bleeding from the site, hardening and pain at the site, drainage or redness from the site, coolness/paleness of the extremity, swelling, or fever, please call [phone]. Please continue your aspirin and plavix as prescribed unless otherwise indicated by your cardiologist. All of your prescriptions have been sent to the pharmacy. Please follow up with Dr. Pereira in 1-2 weeks. All of your needed prescriptions have been sent electronically to your pharmacy.  Secondary Diagnosis:	Hypertension  Goal:	Continue yoru home Ramipril and Metoprolol as prescribed  Assessment and plan of treatment:	You have a history of elevated blood pressure and you should continue your blood pressure medications as prescribed.  Secondary Diagnosis:	Hyperlipidemia  Goal:	Stop your Simvastatin and take Lipitor 40mg daily at bedtime.  Assessment and plan of treatment:	Please continue your daily statin to ensure your cardiac stent remains open.  Secondary Diagnosis:	Diabetes  Assessment and plan of treatment:	You have a diagnosis of diabetes and should continue all of your diabetic medications as prescribed. Please do not take your metformin until Monday 5/14/18 due to the contrast used in the procedure. Unable to assess due to medical condition

## 2024-01-30 ENCOUNTER — EMERGENCY (EMERGENCY)
Facility: HOSPITAL | Age: 69
LOS: 1 days | Discharge: HOME | End: 2024-01-30
Attending: EMERGENCY MEDICINE | Admitting: EMERGENCY MEDICINE
Payer: MEDICARE

## 2024-01-30 VITALS
SYSTOLIC BLOOD PRESSURE: 164 MMHG | OXYGEN SATURATION: 99 % | RESPIRATION RATE: 16 BRPM | HEART RATE: 83 BPM | DIASTOLIC BLOOD PRESSURE: 82 MMHG | TEMPERATURE: 98 F

## 2024-01-30 LAB
APPEARANCE UR: CLEAR — SIGNIFICANT CHANGE UP
BILIRUB UR-MCNC: NEGATIVE — SIGNIFICANT CHANGE UP
COLOR SPEC: YELLOW — SIGNIFICANT CHANGE UP
DIFF PNL FLD: ABNORMAL
GLUCOSE UR QL: >=1000 MG/DL
KETONES UR-MCNC: NEGATIVE MG/DL — SIGNIFICANT CHANGE UP
LEUKOCYTE ESTERASE UR-ACNC: NEGATIVE — SIGNIFICANT CHANGE UP
NITRITE UR-MCNC: NEGATIVE — SIGNIFICANT CHANGE UP
PH UR: 5.5 — SIGNIFICANT CHANGE UP (ref 5–8)
PROT UR-MCNC: SIGNIFICANT CHANGE UP MG/DL
SP GR SPEC: 1.02 — SIGNIFICANT CHANGE UP (ref 1–1.03)
UROBILINOGEN FLD QL: 0.2 MG/DL — SIGNIFICANT CHANGE UP (ref 0.2–1)

## 2024-01-30 PROCEDURE — 99284 EMERGENCY DEPT VISIT MOD MDM: CPT

## 2024-01-30 NOTE — ED PROVIDER NOTE - NSFOLLOWUPINSTRUCTIONS_ED_ALL_ED_FT
YOU WERE SEEN IN THE ER AND DIAGNOSED WITH PHIMOSIS.  IT IS EXTREMELY IMPORTANT THAT YOU FOLLOW UP WITH UROLOGY WITHIN 1 WEEK.  YOU WILL RECEIVE A PHONE CALL TO HELP YOU MAKE AN APPOINTMENT.  IN THE MEANTIME, PLEASE KEEP YOUR GENITALS CLEAN AND RETURN TO THE ER IF YOU ARE UNABLE TO URINATE NORMALLY.    1. TAKE ALL OF YOUR PRESCRIBED OR OVER THE COUNTER MEDICATIONS AS DIRECTED.    2. FOR PAIN OR FEVER YOU CAN TAKE IBUPROFEN (MOTRIN, ADVIL), NAPROXEN (ALLEVE) OR ACETAMINOPHEN (TYLENOL) AS NEEDED, AS DIRECTED ON PACKAGING.  3. FOLLOW UP WITH YOUR PRIMARY DOCTOR WITHIN 5 DAYS, OR SOONER IF DIRECTED.  4. IF YOU HAD LABS OR IMAGING DONE, YOU WERE GIVEN COPIES OF ALL LABS AND/OR IMAGING RESULTS FROM YOUR ER VISIT--PLEASE TAKE THEM WITH YOU TO YOUR FOLLOW UP APPOINTMENTS.  5. IF NEEDED, CALL PATIENT ACCESS SERVICES AT [phone] TO FIND A PRIMARY CARE PHYSICIAN.  OR CALL [phone] TO MAKE AN APPOINTMENT WITH THE CLINIC.  6. YOU CAN FIND PHYSICIANS OF ALL SPECIALITIES BY VISITING [facility] AND CLICKING ON "FIND A DOCTOR".  7. RETURN TO THE ER FOR ANY WORSENING SYMPTOMS OR CONCERNS.    THANK YOU FOR COMING TO [facility].  HAVE A NICE DAY.

## 2024-01-30 NOTE — ED PROVIDER NOTE - CLINICAL SUMMARY MEDICAL DECISION MAKING FREE TEXT BOX
Patient in the emergency department with phimosis, however able to urinate normally, noting some dysuria but no evidence of abscess or skin infection on exam; will check urine for infection and likely refer to urology for further definitive treatment, as there is no evidence of strangulation from the foreskin, or acute infection requiring acute urology assistance Patient in the emergency department with phimosis, however able to urinate normally, noting some dysuria but no evidence of abscess or skin infection on exam; will check urine for infection and likely refer to urology for further definitive treatment, as there is no evidence of strangulation from the foreskin, or acute infection requiring acute urology assistance.

## 2024-01-30 NOTE — ED PROVIDER NOTE - OBJECTIVE STATEMENT
Dr. Bautista: This is a 68-year-old male with diabetes on oral medication, as well as coronary artery disease with stents, in the emergency department with 4 days of feeling inability to retract his foreskin.  Patient notes that for the last 4 days his foreskin has felt tighter than usual, however he is still able to urinate normally, albeit with some dysuria.  He denies fever, nausea, vomiting, diarrhea, abdominal pain.

## 2024-01-30 NOTE — ED ADULT TRIAGE NOTE - CHIEF COMPLAINT QUOTE
Pt presents for discomfort to penile area for the past week accompanied with dysuria. Pt denies hematuria, no abd pain, afebrile.

## 2024-01-30 NOTE — ED PROVIDER NOTE - PATIENT PORTAL LINK FT
You can access the FollowMyHealth Patient Portal offered by [facility] by registering at the following website: [email or web address]. By joining [employer]’s FollowMyHealth portal, you will also be able to view your health information using other applications (apps) compatible with our system.

## 2024-01-30 NOTE — ED PROVIDER NOTE - PHYSICAL EXAMINATION
Physical exam with chaperone SUJEY Pereira showing uncircumcised penis, foreskin mobile but not able to fully retract over glans, but no erythema, urethral discharge, swelling, or obvious abscess;  penis and foreskin NONTENDER to palpation, pt denies any pain; testicles nontender bilaterally, no obvious inguinal hernias. Physical exam with chaperone SUJEY Pereira showing uncircumcised penis, foreskin mobile but not able to fully retract over glans, but no erythema, urethral discharge, swelling, or obvious abscess;  penis and foreskin NONTENDER to palpation, pt denies any pain; testicles nontender bilaterally, no obvious inguinal hernias.    abdomen nontender/nondistended

## 2024-01-30 NOTE — ED PROVIDER NOTE - PROGRESS NOTE DETAILS
Dr. Bautista: Patient stable and in no acute distress, UA noted to show blood and glucose, but no evidence of infection; patient is well-appearing, and stable for discharge with urology follow-up Dr. Bautista: Patient stable and in no acute distress, UA noted to show blood and glucose, but no evidence of infection; UCx pending; patient is well-appearing, and stable for discharge with urology follow-up Dr. Bautista: Patient stable and in no acute distress, UA noted to show blood and glucose, but no evidence of infection; UCx pending; patient is well-appearing, and stable for discharge with urology follow-up; pt urinating normally, without difficulty, at time of discharge

## 2024-01-30 NOTE — ED ADULT TRIAGE NOTE - AS PAIN REST
5 (moderate pain) [General Appearance - Alert] : alert [General Appearance - In No Acute Distress] : in no acute distress [PERRL With Normal Accommodation] : pupils were equal in size, round, and reactive to light [Sclera] : the sclera and conjunctiva were normal [Extraocular Movements] : extraocular movements were intact [Outer Ear] : the ears and nose were normal in appearance [Oropharynx] : the oropharynx was normal [Neck Cervical Mass (___cm)] : no neck mass was observed [Neck Appearance] : the appearance of the neck was normal [Jugular Venous Distention Increased] : there was no jugular-venous distention [Thyroid Diffuse Enlargement] : the thyroid was not enlarged [Thyroid Nodule] : there were no palpable thyroid nodules [Auscultation Breath Sounds / Voice Sounds] : lungs were clear to auscultation bilaterally [Heart Rate And Rhythm] : heart rate was normal and rhythm regular [Heart Sounds] : normal S1 and S2 [Heart Sounds Gallop] : no gallops [Murmurs] : no murmurs [Heart Sounds Pericardial Friction Rub] : no pericardial rub [Edema] : there was no peripheral edema [Bowel Sounds] : normal bowel sounds [Abdomen Soft] : soft [] : no hepato-splenomegaly [Abdomen Tenderness] : non-tender [Abdomen Mass (___ Cm)] : no abdominal mass palpated [Normal Sphincter Tone] : normal sphincter tone [No Rectal Mass] : no rectal mass [No CVA Tenderness] : no ~M costovertebral angle tenderness [Musculoskeletal - Swelling] : no joint swelling seen [Skin Color & Pigmentation] : normal skin color and pigmentation [Occult Blood Positive] : stool was negative for occult blood

## 2024-02-01 LAB
CULTURE RESULTS: ABNORMAL
SPECIMEN SOURCE: SIGNIFICANT CHANGE UP

## 2024-02-02 RX ORDER — CEPHALEXIN 500 MG
1 CAPSULE ORAL
Qty: 28 | Refills: 0
Start: 2024-02-02 | End: 2024-02-08

## 2024-02-07 ENCOUNTER — APPOINTMENT (OUTPATIENT)
Age: 69
End: 2024-02-07
Payer: MEDICARE

## 2024-02-07 VITALS
WEIGHT: 162 LBS | BODY MASS INDEX: 28.7 KG/M2 | HEART RATE: 90 BPM | OXYGEN SATURATION: 95 % | SYSTOLIC BLOOD PRESSURE: 168 MMHG | DIASTOLIC BLOOD PRESSURE: 94 MMHG | TEMPERATURE: 97.2 F | HEIGHT: 63 IN

## 2024-02-07 DIAGNOSIS — Z86.39 PERSONAL HISTORY OF OTHER ENDOCRINE, NUTRITIONAL AND METABOLIC DISEASE: ICD-10-CM

## 2024-02-07 DIAGNOSIS — Z78.9 OTHER SPECIFIED HEALTH STATUS: ICD-10-CM

## 2024-02-07 DIAGNOSIS — Z86.79 PERSONAL HISTORY OF OTHER DISEASES OF THE CIRCULATORY SYSTEM: ICD-10-CM

## 2024-02-07 PROCEDURE — 99204 OFFICE O/P NEW MOD 45 MIN: CPT

## 2024-02-07 RX ORDER — RAMIPRIL 5 MG/1
5 CAPSULE ORAL
Refills: 0 | Status: ACTIVE | COMMUNITY

## 2024-02-07 RX ORDER — EMPAGLIFLOZIN 25 MG/1
TABLET, FILM COATED ORAL
Refills: 0 | Status: ACTIVE | COMMUNITY

## 2024-02-07 RX ORDER — ATORVASTATIN CALCIUM 80 MG/1
TABLET, FILM COATED ORAL
Refills: 0 | Status: ACTIVE | COMMUNITY

## 2024-02-07 RX ORDER — SITAGLIPTIN 100 MG/1
100 TABLET, FILM COATED ORAL
Refills: 0 | Status: ACTIVE | COMMUNITY

## 2024-02-07 RX ORDER — GLIPIZIDE 10 MG/1
10 TABLET ORAL
Refills: 0 | Status: ACTIVE | COMMUNITY

## 2024-02-07 RX ORDER — CLOTRIMAZOLE AND BETAMETHASONE DIPROPIONATE 10; .5 MG/G; MG/G
1-0.05 CREAM TOPICAL TWICE DAILY
Qty: 1 | Refills: 3 | Status: ACTIVE | COMMUNITY
Start: 2024-02-07 | End: 1900-01-01

## 2024-02-07 RX ORDER — PIOGLITAZONE HYDROCHLORIDE 15 MG/1
15 TABLET ORAL
Refills: 0 | Status: ACTIVE | COMMUNITY

## 2024-02-07 NOTE — REVIEW OF SYSTEMS
[Eyesight Problems] : eyesight problems [No erections] : no erections [Pain after urination] : pain after urination [Negative] : Heme/Lymph

## 2024-02-19 PROBLEM — Z78.9 NON-SMOKER: Status: ACTIVE | Noted: 2024-02-07

## 2024-02-19 NOTE — ASSESSMENT
[FreeTextEntry1] : 69 yo M with flank pain history and phimosis  - Reviewed records from ER visit through [facility] - Discussed potential etiologies for flank pain -  vs. non- - Discussed pros and cons of renal US and will proceed - Reviewed options for phimosis including observation vs steroid cream vs surgical intervention - For now, will try steroid cream. Rx transmitted - FU in 1 month

## 2024-02-19 NOTE — HISTORY OF PRESENT ILLNESS
[FreeTextEntry1] : 67 yo M presents with right flank pain about 2 weeks ago intermittent and lasted a few days resolved but then went to ER for dysuria and penile tip pain no gross hematuria no bothersome LUTS - no straining  Drinks 10 bottles of water, 1 cup of coffee no pain with erection

## 2024-02-19 NOTE — PHYSICAL EXAM
[Normal Appearance] : normal appearance [Well Groomed] : well groomed [General Appearance - In No Acute Distress] : no acute distress [Edema] : no peripheral edema [Respiration, Rhythm And Depth] : normal respiratory rhythm and effort [Exaggerated Use Of Accessory Muscles For Inspiration] : no accessory muscle use [Abdomen Soft] : soft [Abdomen Tenderness] : non-tender [Costovertebral Angle Tenderness] : no ~M costovertebral angle tenderness [Urethral Meatus] : meatus normal [Penis Abnormality] : normal uncircumcised penis [Urinary Bladder Findings] : the bladder was normal on palpation [Scrotum] : the scrotum was normal [Testes Tenderness] : no tenderness of the testes [Testes Mass (___cm)] : there were no testicular masses [Normal Station and Gait] : the gait and station were normal for the patient's age [No Focal Deficits] : no focal deficits [] : no rash [Affect] : the affect was normal [Oriented To Time, Place, And Person] : oriented to person, place, and time [Mood] : the mood was normal [No Palpable Adenopathy] : no palpable adenopathy [de-identified] : Some phimosis and unable to expose glans completely

## 2024-03-07 ENCOUNTER — APPOINTMENT (OUTPATIENT)
Age: 69
End: 2024-03-07
Payer: MEDICARE

## 2024-03-07 DIAGNOSIS — N28.1 CYST OF KIDNEY, ACQUIRED: ICD-10-CM

## 2024-03-07 DIAGNOSIS — R10.9 UNSPECIFIED ABDOMINAL PAIN: ICD-10-CM

## 2024-03-07 DIAGNOSIS — N47.1 PHIMOSIS: ICD-10-CM

## 2024-03-07 PROCEDURE — 99214 OFFICE O/P EST MOD 30 MIN: CPT

## 2024-03-14 ENCOUNTER — APPOINTMENT (OUTPATIENT)
Dept: CT IMAGING | Facility: CLINIC | Age: 69
End: 2024-03-14

## 2024-03-17 PROBLEM — R10.9 FLANK PAIN: Status: ACTIVE | Noted: 2024-02-07

## 2024-03-17 PROBLEM — N28.1 COMPLEX RENAL CYST: Status: ACTIVE | Noted: 2024-03-07

## 2024-03-17 PROBLEM — N47.1 PHIMOSIS: Status: ACTIVE | Noted: 2024-02-07

## 2024-03-17 NOTE — HISTORY OF PRESENT ILLNESS
[FreeTextEntry1] : 69 yo M presents with right flank pain about 2 weeks ago intermittent and lasted a few days resolved but then went to ER for dysuria and penile tip pain no gross hematuria no bothersome LUTS - no straining  Drinks 10 bottles of water, 1 cup of coffee no pain with erection  3/7/24 Interval history: No more flank pain Phimosis better with steroid cream

## 2024-03-17 NOTE — PHYSICAL EXAM
[Well Groomed] : well groomed [Normal Appearance] : normal appearance [General Appearance - In No Acute Distress] : no acute distress [Edema] : no peripheral edema [Exaggerated Use Of Accessory Muscles For Inspiration] : no accessory muscle use [Respiration, Rhythm And Depth] : normal respiratory rhythm and effort [Abdomen Soft] : soft [Abdomen Tenderness] : non-tender [Costovertebral Angle Tenderness] : no ~M costovertebral angle tenderness [Urethral Meatus] : meatus normal [Urinary Bladder Findings] : the bladder was normal on palpation [Penis Abnormality] : normal uncircumcised penis [Scrotum] : the scrotum was normal [Testes Mass (___cm)] : there were no testicular masses [Testes Tenderness] : no tenderness of the testes [Normal Station and Gait] : the gait and station were normal for the patient's age [] : no rash [No Focal Deficits] : no focal deficits [Oriented To Time, Place, And Person] : oriented to person, place, and time [Affect] : the affect was normal [Mood] : the mood was normal [No Palpable Adenopathy] : no palpable adenopathy [de-identified] : Some phimosis and unable to expose glans completely

## 2024-03-17 NOTE — ASSESSMENT
[FreeTextEntry1] : 67 yo M with complex renal cyst and phimosis  - Reviewed renal US findings with pt - Discussed renal cyst classification system. Will obtain CT Abdomen with and without contrast - Continue steroid cream

## 2024-03-21 ENCOUNTER — APPOINTMENT (OUTPATIENT)
Dept: CT IMAGING | Facility: CLINIC | Age: 69
End: 2024-03-21
Payer: MEDICARE

## 2024-03-21 PROCEDURE — 74170 CT ABD WO CNTRST FLWD CNTRST: CPT
